# Patient Record
(demographics unavailable — no encounter records)

---

## 2025-02-25 NOTE — HISTORY OF PRESENT ILLNESS
[de-identified] : prior evaluation of thyroid nodule. cytologically benign. denies dysphagia, hoarseness, SOB or RT exposure. recent sonogram stable. no changes medically since last visit.  I have reviewed all old and new data and available images. Additional information was obtained from others present at the time of visit to ensure the completeness of the history

## 2025-02-25 NOTE — ASSESSMENT
[FreeTextEntry1] : will observe. bloods drawn. to call next week for results. sonogram next visit. to return earlier if any change.  patient has been given the opportunity to ask questions, and all of the patient's questions have been answered to their satisfaction\par

## 2025-02-25 NOTE — PHYSICAL EXAM
[de-identified] : 3.5 cm right thyroid nodule, well circumscribed and mobile [Laryngoscopy Performed] : laryngoscopy was performed, see procedure section for findings [Midline] : located in midline position [Normal] : orientation to person, place, and time: normal